# Patient Record
Sex: FEMALE | Race: WHITE | NOT HISPANIC OR LATINO | Employment: OTHER | URBAN - METROPOLITAN AREA
[De-identification: names, ages, dates, MRNs, and addresses within clinical notes are randomized per-mention and may not be internally consistent; named-entity substitution may affect disease eponyms.]

---

## 2023-04-24 ENCOUNTER — HOSPITAL ENCOUNTER (OUTPATIENT)
Dept: DATA CONVERSION | Facility: HOSPITAL | Age: 63
End: 2023-04-24
Attending: ANESTHESIOLOGY | Admitting: ANESTHESIOLOGY
Payer: COMMERCIAL

## 2023-04-24 DIAGNOSIS — M54.50 LOW BACK PAIN, UNSPECIFIED: ICD-10-CM

## 2023-04-24 DIAGNOSIS — M51.26 OTHER INTERVERTEBRAL DISC DISPLACEMENT, LUMBAR REGION: ICD-10-CM

## 2023-04-24 DIAGNOSIS — Z88.0 ALLERGY STATUS TO PENICILLIN: ICD-10-CM

## 2023-04-24 DIAGNOSIS — M47.816 SPONDYLOSIS WITHOUT MYELOPATHY OR RADICULOPATHY, LUMBAR REGION: ICD-10-CM

## 2023-08-22 ENCOUNTER — HOSPITAL ENCOUNTER (OUTPATIENT)
Dept: DATA CONVERSION | Facility: HOSPITAL | Age: 63
End: 2023-08-22
Attending: ANESTHESIOLOGY | Admitting: ANESTHESIOLOGY
Payer: COMMERCIAL

## 2023-08-22 DIAGNOSIS — M48.061 SPINAL STENOSIS, LUMBAR REGION WITHOUT NEUROGENIC CLAUDICATION: ICD-10-CM

## 2023-08-22 DIAGNOSIS — M54.50 LOW BACK PAIN, UNSPECIFIED: ICD-10-CM

## 2023-08-22 DIAGNOSIS — M47.816 SPONDYLOSIS WITHOUT MYELOPATHY OR RADICULOPATHY, LUMBAR REGION: ICD-10-CM

## 2023-09-07 VITALS — HEIGHT: 66 IN | BODY MASS INDEX: 25.12 KG/M2 | WEIGHT: 156.31 LBS

## 2023-09-29 VITALS — BODY MASS INDEX: 25.12 KG/M2 | HEIGHT: 66 IN | WEIGHT: 156.31 LBS

## 2023-10-03 PROBLEM — S39.011A: Status: ACTIVE | Noted: 2023-10-03

## 2023-10-03 PROBLEM — M48.061 LUMBAR STENOSIS: Status: ACTIVE | Noted: 2023-10-03

## 2023-10-03 PROBLEM — S43.92XA SPRAIN OF LEFT SHOULDER GIRDLE: Status: ACTIVE | Noted: 2023-10-03

## 2023-10-03 PROBLEM — M51.26 LUMBAR DISC DISPLACEMENT WITHOUT MYELOPATHY: Status: ACTIVE | Noted: 2023-10-03

## 2023-10-03 PROBLEM — M41.9 SCOLIOSIS: Status: ACTIVE | Noted: 2023-10-03

## 2023-10-03 PROBLEM — S23.3XXA SPRAIN OF LIGAMENTS OF THORACIC SPINE: Status: ACTIVE | Noted: 2023-10-03

## 2023-10-03 RX ORDER — GABAPENTIN 600 MG/1
600 TABLET ORAL 3 TIMES DAILY
COMMUNITY
End: 2023-10-04 | Stop reason: SDUPTHER

## 2023-10-03 RX ORDER — CLONIDINE HYDROCHLORIDE 0.1 MG/1
0.1 TABLET ORAL 3 TIMES DAILY
COMMUNITY

## 2023-10-03 RX ORDER — OXYCODONE AND ACETAMINOPHEN 5; 325 MG/1; MG/1
1 TABLET ORAL EVERY 6 HOURS PRN
COMMUNITY

## 2023-10-03 RX ORDER — GABAPENTIN 600 MG/1
1.5 TABLET ORAL 3 TIMES DAILY
COMMUNITY

## 2023-10-03 RX ORDER — ATORVASTATIN CALCIUM 40 MG/1
40 TABLET, FILM COATED ORAL DAILY
COMMUNITY

## 2023-10-03 RX ORDER — PANTOPRAZOLE SODIUM 40 MG/1
TABLET, DELAYED RELEASE ORAL
COMMUNITY
Start: 2023-08-17

## 2023-10-03 RX ORDER — AMITRIPTYLINE HYDROCHLORIDE 50 MG/1
50 TABLET, FILM COATED ORAL NIGHTLY
COMMUNITY

## 2023-10-03 RX ORDER — DEXLANSOPRAZOLE 60 MG/1
60 CAPSULE, DELAYED RELEASE ORAL
COMMUNITY
Start: 2021-10-06

## 2023-10-04 ENCOUNTER — OFFICE VISIT (OUTPATIENT)
Dept: PAIN MEDICINE | Facility: CLINIC | Age: 63
End: 2023-10-04
Payer: COMMERCIAL

## 2023-10-04 VITALS
HEIGHT: 65 IN | TEMPERATURE: 97.8 F | BODY MASS INDEX: 24.26 KG/M2 | SYSTOLIC BLOOD PRESSURE: 143 MMHG | RESPIRATION RATE: 16 BRPM | WEIGHT: 145.6 LBS | DIASTOLIC BLOOD PRESSURE: 93 MMHG | HEART RATE: 92 BPM

## 2023-10-04 DIAGNOSIS — M51.26 LUMBAR DISC DISPLACEMENT WITHOUT MYELOPATHY: Primary | ICD-10-CM

## 2023-10-04 DIAGNOSIS — M43.06 LUMBAR SPONDYLOLYSIS: ICD-10-CM

## 2023-10-04 PROCEDURE — 99214 OFFICE O/P EST MOD 30 MIN: CPT | Performed by: ANESTHESIOLOGY

## 2023-10-04 PROCEDURE — G0463 HOSPITAL OUTPT CLINIC VISIT: HCPCS | Performed by: ANESTHESIOLOGY

## 2023-10-04 ASSESSMENT — COLUMBIA-SUICIDE SEVERITY RATING SCALE - C-SSRS
2. HAVE YOU ACTUALLY HAD ANY THOUGHTS OF KILLING YOURSELF?: NO
6. HAVE YOU EVER DONE ANYTHING, STARTED TO DO ANYTHING, OR PREPARED TO DO ANYTHING TO END YOUR LIFE?: NO
1. IN THE PAST MONTH, HAVE YOU WISHED YOU WERE DEAD OR WISHED YOU COULD GO TO SLEEP AND NOT WAKE UP?: NO

## 2023-10-04 ASSESSMENT — PAIN SCALES - GENERAL
PAINLEVEL_OUTOF10: 3
PAINLEVEL: 3

## 2023-10-04 ASSESSMENT — LIFESTYLE VARIABLES: TOTAL SCORE: 6

## 2023-10-04 ASSESSMENT — ENCOUNTER SYMPTOMS
CARDIOVASCULAR NEGATIVE: 1
ENDOCRINE NEGATIVE: 1
BACK PAIN: 1
LOSS OF SENSATION IN FEET: 0
DEPRESSION: 1
EYES NEGATIVE: 1
OCCASIONAL FEELINGS OF UNSTEADINESS: 0
PSYCHIATRIC NEGATIVE: 1
GASTROINTESTINAL NEGATIVE: 1
RESPIRATORY NEGATIVE: 1
HEMATOLOGIC/LYMPHATIC NEGATIVE: 1

## 2023-10-04 ASSESSMENT — PAIN DESCRIPTION - DESCRIPTORS: DESCRIPTORS: OTHER (COMMENT)

## 2023-10-04 ASSESSMENT — PAIN - FUNCTIONAL ASSESSMENT: PAIN_FUNCTIONAL_ASSESSMENT: 0-10

## 2023-10-04 ASSESSMENT — PATIENT HEALTH QUESTIONNAIRE - PHQ9
2. FEELING DOWN, DEPRESSED OR HOPELESS: SEVERAL DAYS
SUM OF ALL RESPONSES TO PHQ9 QUESTIONS 1 AND 2: 2
10. IF YOU CHECKED OFF ANY PROBLEMS, HOW DIFFICULT HAVE THESE PROBLEMS MADE IT FOR YOU TO DO YOUR WORK, TAKE CARE OF THINGS AT HOME, OR GET ALONG WITH OTHER PEOPLE: SOMEWHAT DIFFICULT
1. LITTLE INTEREST OR PLEASURE IN DOING THINGS: SEVERAL DAYS
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
## 2023-10-04 NOTE — PROGRESS NOTES
This is 63 y.o.  female with who has been treated for Lower back pain and Left Buttocks pain . She states that After she had her RFA on 8/22/2023 .she has developed left leg numbness and tingling .Pain is better, The pain is described as  grinding  and is relieved by Medications percocet and gabapentin   .Patient here for follow-up in regards to the RFA that she had done August 22, 2023 follow-up.  Chief Complaint   Patient presents with    Back Pain    Leg Pain     Patient here with lower back pain left buttocks pain and some numbness and tingling down the left leg sense of that was August 22 after her bilateral RFA L3-L5       Pain Therapies: chronic opioid therapy percocet provided by a worker comp pain management Doctor Named

## 2023-10-26 ENCOUNTER — TELEPHONE (OUTPATIENT)
Dept: NEUROSURGERY | Facility: CLINIC | Age: 63
End: 2023-10-26
Payer: COMMERCIAL

## 2023-11-15 ENCOUNTER — OFFICE VISIT (OUTPATIENT)
Dept: PAIN MEDICINE | Facility: CLINIC | Age: 63
End: 2023-11-15
Payer: COMMERCIAL

## 2023-11-15 VITALS
BODY MASS INDEX: 24.49 KG/M2 | HEIGHT: 65 IN | OXYGEN SATURATION: 97 % | SYSTOLIC BLOOD PRESSURE: 121 MMHG | RESPIRATION RATE: 16 BRPM | WEIGHT: 147 LBS | HEART RATE: 94 BPM | DIASTOLIC BLOOD PRESSURE: 88 MMHG | TEMPERATURE: 97.8 F

## 2023-11-15 DIAGNOSIS — M51.26 LUMBAR DISC DISPLACEMENT WITHOUT MYELOPATHY: ICD-10-CM

## 2023-11-15 DIAGNOSIS — R20.0 NUMBNESS OF TOES: Primary | ICD-10-CM

## 2023-11-15 PROCEDURE — G0463 HOSPITAL OUTPT CLINIC VISIT: HCPCS | Performed by: ANESTHESIOLOGY

## 2023-11-15 PROCEDURE — 1036F TOBACCO NON-USER: CPT | Performed by: ANESTHESIOLOGY

## 2023-11-15 PROCEDURE — 99214 OFFICE O/P EST MOD 30 MIN: CPT | Performed by: ANESTHESIOLOGY

## 2023-11-15 ASSESSMENT — ENCOUNTER SYMPTOMS
EYES NEGATIVE: 1
DEPRESSION: 0
LOSS OF SENSATION IN FEET: 0
NUMBNESS: 1
OCCASIONAL FEELINGS OF UNSTEADINESS: 0
BACK PAIN: 1
RESPIRATORY NEGATIVE: 1
CARDIOVASCULAR NEGATIVE: 1
GASTROINTESTINAL NEGATIVE: 1
HEMATOLOGIC/LYMPHATIC NEGATIVE: 1
PSYCHIATRIC NEGATIVE: 1
ENDOCRINE NEGATIVE: 1

## 2023-11-15 ASSESSMENT — PAIN - FUNCTIONAL ASSESSMENT: PAIN_FUNCTIONAL_ASSESSMENT: 0-10

## 2023-11-15 ASSESSMENT — PAIN SCALES - GENERAL
PAINLEVEL_OUTOF10: 3
PAINLEVEL: 3
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
## 2023-11-15 NOTE — PROGRESS NOTES
This is 63 y.o.  female with who has been treated for  left leg numbness . Pain is unchanged, The pain is described as  numbness  and is relieved by Nothing . Here for follow-up . Would like to discuss possible emg study .  Chief Complaint   Patient presents with    Follow-up     Left leg numbness        Pain Therapies: chronic opioid therapy

## 2024-01-26 ENCOUNTER — APPOINTMENT (OUTPATIENT)
Dept: NEUROLOGY | Facility: HOSPITAL | Age: 64
End: 2024-01-26
Payer: COMMERCIAL